# Patient Record
Sex: MALE | Race: WHITE | NOT HISPANIC OR LATINO | Employment: UNEMPLOYED | URBAN - METROPOLITAN AREA
[De-identification: names, ages, dates, MRNs, and addresses within clinical notes are randomized per-mention and may not be internally consistent; named-entity substitution may affect disease eponyms.]

---

## 2017-09-27 ENCOUNTER — HOSPITAL ENCOUNTER (EMERGENCY)
Facility: HOSPITAL | Age: 37
Discharge: HOME | End: 2017-09-27
Attending: EMERGENCY MEDICINE
Payer: OTHER MISCELLANEOUS

## 2017-09-27 VITALS
RESPIRATION RATE: 12 BRPM | HEIGHT: 70 IN | WEIGHT: 175 LBS | DIASTOLIC BLOOD PRESSURE: 79 MMHG | SYSTOLIC BLOOD PRESSURE: 126 MMHG | BODY MASS INDEX: 25.05 KG/M2 | OXYGEN SATURATION: 100 % | HEART RATE: 68 BPM | TEMPERATURE: 98 F

## 2017-09-27 DIAGNOSIS — S61.210A LACERATION OF RIGHT INDEX FINGER WITHOUT FOREIGN BODY WITHOUT DAMAGE TO NAIL, INITIAL ENCOUNTER: Primary | ICD-10-CM

## 2017-09-27 PROCEDURE — 12041 INTMD RPR N-HF/GENIT 2.5CM/<: CPT | Mod: F6

## 2017-09-27 PROCEDURE — 25000003 PHARM REV CODE 250

## 2017-09-27 PROCEDURE — 99283 EMERGENCY DEPT VISIT LOW MDM: CPT | Mod: 25

## 2017-09-27 PROCEDURE — 25000003 PHARM REV CODE 250: Performed by: PHYSICIAN ASSISTANT

## 2017-09-27 RX ORDER — IBUPROFEN 600 MG/1
600 TABLET ORAL
Status: COMPLETED | OUTPATIENT
Start: 2017-09-27 | End: 2017-09-27

## 2017-09-27 RX ORDER — LIDOCAINE HYDROCHLORIDE 10 MG/ML
10 INJECTION INFILTRATION; PERINEURAL
Status: COMPLETED | OUTPATIENT
Start: 2017-09-27 | End: 2017-09-27

## 2017-09-27 RX ORDER — IBUPROFEN 600 MG/1
600 TABLET ORAL EVERY 8 HOURS PRN
Qty: 20 TABLET | Refills: 0 | Status: SHIPPED | OUTPATIENT
Start: 2017-09-27

## 2017-09-27 RX ORDER — LIDOCAINE HYDROCHLORIDE 10 MG/ML
INJECTION, SOLUTION EPIDURAL; INFILTRATION; INTRACAUDAL; PERINEURAL
Status: COMPLETED
Start: 2017-09-27 | End: 2017-09-27

## 2017-09-27 RX ADMIN — LIDOCAINE HYDROCHLORIDE 10 MG: 10 INJECTION, SOLUTION EPIDURAL; INFILTRATION; INTRACAUDAL; PERINEURAL at 10:09

## 2017-09-27 RX ADMIN — IBUPROFEN 600 MG: 600 TABLET ORAL at 10:09

## 2017-09-27 RX ADMIN — LIDOCAINE HYDROCHLORIDE 10 MG: 10 INJECTION INFILTRATION; PERINEURAL at 10:09

## 2017-09-27 RX ADMIN — BACITRACIN, NEOMYCIN, POLYMYXIN B 1 EACH: 400; 3.5; 5 OINTMENT TOPICAL at 10:09

## 2017-09-27 NOTE — ED NOTES
Wellness works faxed by [employer].   Given written and verbal DC instructions questions answered per MD aware to follow up with PCP encouraged to return if needed. Given RX with teaching.

## 2017-09-27 NOTE — ED PROVIDER NOTES
Encounter Date: 9/27/2017       History     Chief Complaint   Patient presents with    Laceration     R index fingertip with table saw this am.     Hugo Petty is a 37 y.o. Male presenting for evaluation of laceration to the tip of the right index finger.  Patient states that he was working with a table saw this morning at work, when he accidentally cut his finger.  His tetanus is up-to-date.  He has not taken any medication for his pain.  He denies any numbness, tingling or weakness.      The history is provided by the patient.     Review of patient's allergies indicates:  No Known Allergies  History reviewed. No pertinent past medical history.  History reviewed. No pertinent surgical history.  History reviewed. No pertinent family history.  Social History   Substance Use Topics    Smoking status: Current Every Day Smoker     Packs/day: 0.50     Types: Cigarettes    Smokeless tobacco: Never Used    Alcohol use Yes      Comment: occas     Review of Systems   Constitutional: Negative for chills and fever.   Musculoskeletal: Negative for arthralgias, back pain, joint swelling, myalgias, neck pain and neck stiffness.   Skin: Positive for wound. Negative for color change, pallor and rash.   Neurological: Negative for weakness and numbness.   Hematological: Does not bruise/bleed easily.       Physical Exam     Initial Vitals [09/27/17 0955]   BP Pulse Resp Temp SpO2   126/79 68 12 98.1 °F (36.7 °C) 100 %      MAP       94.67         Physical Exam    Nursing note and vitals reviewed.  Constitutional: He appears well-developed and well-nourished. He is not diaphoretic. No distress.   Cardiovascular: Intact distal pulses.   Musculoskeletal: Normal range of motion. He exhibits tenderness. He exhibits no edema.        Right hand: He exhibits tenderness and laceration. He exhibits normal range of motion, no bony tenderness, no deformity and no swelling. Normal sensation noted. Normal strength noted.        Hands:  1 cm  laceration and skin avulsion noted to the distal finger pad of right second finger.  No visible foreign bodies.  No nailbed involvement.  No bony tenderness.  No decreased range of motion, decreased strength or loss of sensation to right second finger.   Neurological: He is alert and oriented to person, place, and time. He has normal strength. No sensory deficit.   Skin: Skin is warm and dry. No rash and no abscess noted. No erythema.         ED Course   Lac Repair  Date/Time: 9/27/2017 5:44 PM  Performed by: NASEEM BERNAL  Authorized by: LEORA SALEEM   Body area: upper extremity  Location details: right index finger  Laceration length: 1 cm  Foreign bodies: no foreign bodies  Tendon involvement: none  Nerve involvement: none  Vascular damage: no  Anesthesia: digital block    Anesthesia:  Local Anesthetic: lidocaine 1% without epinephrine  Anesthetic total: 3 mL  Patient sedated: no  Preparation: Patient was prepped and draped in the usual sterile fashion.  Irrigation solution: saline  Irrigation method: syringe  Amount of cleaning: extensive  Skin closure: 4-0 nylon  Number of sutures: 3  Technique: simple  Approximation: close  Approximation difficulty: complex  Dressing: non-stick sterile dressing and antibiotic ointment  Patient tolerance: Patient tolerated the procedure well with no immediate complications        Labs Reviewed - No data to display          Medical Decision Making:   Differential Diagnosis:   Laceration  Fracture  Foreign body         APC / Resident Notes:   He tolerated the laceration repair well.  His tetanus is up-to-date.  He is discharged home to follow-up with his primary care provider for reevaluation and suture removal in 5-7 days.  There was a small area of skin avulsion, which was not able to be repaired.  He voices understanding and is agreeable to plan.  He is given specific return precautions.              ED Course      Clinical Impression:   The encounter diagnosis  was Laceration of right index finger without foreign body without damage to nail, initial encounter.                           Safia Mcknight PA-C  09/27/17 [id number]